# Patient Record
Sex: FEMALE | URBAN - METROPOLITAN AREA
[De-identification: names, ages, dates, MRNs, and addresses within clinical notes are randomized per-mention and may not be internally consistent; named-entity substitution may affect disease eponyms.]

---
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
## 2025-03-16 ENCOUNTER — HOSPITAL ENCOUNTER (INPATIENT)
Dept: HOSPITAL 104 - SERX | Age: 20
LOS: 4 days | Discharge: HOME | DRG: 720 | End: 2025-03-20
Payer: MEDICAID

## 2025-03-16 VITALS
TEMPERATURE: 100.04 F | OXYGEN SATURATION: 97 % | HEART RATE: 147 BPM | DIASTOLIC BLOOD PRESSURE: 56 MMHG | RESPIRATION RATE: 20 BRPM | SYSTOLIC BLOOD PRESSURE: 94 MMHG

## 2025-03-16 DIAGNOSIS — A41.51: Primary | ICD-10-CM

## 2025-03-16 DIAGNOSIS — N10: ICD-10-CM

## 2025-03-16 DIAGNOSIS — K35.80: ICD-10-CM

## 2025-03-16 DIAGNOSIS — Z87.440: ICD-10-CM

## 2025-03-16 DIAGNOSIS — E87.20: ICD-10-CM

## 2025-03-16 LAB
ALANINE AMINOTRANSFERASE: 20 U/L (ref 10–49)
ALBUMIN, SERUM: 4.3 GM/DL (ref 3.5–5)
ALBUMIN/GLOBULIN RATIO: 1.5 (ref 1.2–2.2)
ALKALINE PHOSPHATASE: 74 U/L (ref 46–116)
ANION GAP: 11 (ref 7–16)
ASPARTATE AMINO TRANSFERASE: 17 U/L (ref 0–34)
BASOPHILS # (AUTO): 0.1 THOU/MM3 (ref 0–0.2)
BASOPHILS % (AUTO): 0 % (ref 0–2.5)
BILIRUBIN,TOTAL: 0.9 MG/DL (ref 0.3–1.2)
BLOOD UREA NITROGEN: 9 MG/DL (ref 9–23)
BUN/CREATININE RATIO: 13 RATIO (ref 12–20)
CALCIUM (CORRECTED): 9.3 MG/DL (ref 8.5–10.1)
CALCIUM: 9.3 MG/DL (ref 8.3–10.6)
CARBON DIOXIDE: 24.4 MMOL/L (ref 20–31)
CHLORIDE: 103 MMOL/L (ref 98–107)
CREAT CL PREDICTED SERPL C-G-VRATE: 125.6 ML/MIN (ref 60–?)
CREATININE (COMPONENT): 0.7 MG/DL (ref 0.6–1.3)
EGFR: > 60 SEE NOTE
EOSINOPHILS # (AUTO): 0 THOU/MM3 (ref 0–0.5)
EOSINOPHILS % (AUTO): 0 % (ref 0–10)
GLOBULIN: 2.9 GM/DL (ref 2.3–3.5)
GLUCOSE: 108 MG/DL (ref 74–106)
HEMATOCRIT: 35.3 % (ref 36–46)
HEMOGLOBIN: 12.3 G/DL (ref 12–16)
IMM GRANULOCYTES # BLD AUTO: 0.45 THOU/MM3 (ref 0–0)
IMMATURE GRANULOCYTES % (AUTO): 2 % (ref 0–0)
LYMPHOCYTES # (AUTO): 1 THOU/MM3 (ref 1–4.8)
LYMPHOCYTES % (AUTO): 3 % (ref 10–50)
MEAN CORPUSCULAR HEMOGLOBIN: 29.2 PG (ref 25–35)
MEAN CORPUSCULAR HGB CONC: 34.8 G/DL (ref 31–37)
MEAN CORPUSCULAR VOLUME: 84 FL (ref 80–100)
MONOCYTES # (AUTO): 2.1 THOU/MM3 (ref 0–0.8)
MONOCYTES % (AUTO): 7 % (ref 0–12)
NEUTROPHILS # (AUTO): 24.8 THOU/MM3 (ref 1.8–7.7)
NEUTROPHILS % (AUTO): 88 % (ref 37–80)
NRBC BLD-RTO: 0 /100 WBC
NUCLEATED RED BLOOD CELL #: 0 THOU/MM3 (ref 0–0)
OSMOLALITY,CALCULATED: 275 (ref 275–295)
PLATELET COUNT: 176 THOU/MM3 (ref 140–440)
POTASSIUM: 3.7 MMOL/L (ref 3.4–5.1)
RDW STANDARD DEVIATION: 38.1 FL (ref 36.4–46.3)
RED BLOOD COUNT: 4.21 MILN/MM3 (ref 4–5.2)
SODIUM: 138 MMOL/L (ref 136–145)
TOTAL PROTEIN: 7.2 GM/DL (ref 5.7–8.2)
WHITE BLOOD COUNT: 28.4 THOU/MM3 (ref 3.6–11)

## 2025-03-16 PROCEDURE — 80053 COMPREHEN METABOLIC PANEL: CPT

## 2025-03-16 PROCEDURE — 71045 X-RAY EXAM CHEST 1 VIEW: CPT

## 2025-03-16 PROCEDURE — 87077 CULTURE AEROBIC IDENTIFY: CPT

## 2025-03-16 PROCEDURE — 87400 INFLUENZA A/B EACH AG IA: CPT

## 2025-03-16 PROCEDURE — 74176 CT ABD & PELVIS W/O CONTRAST: CPT

## 2025-03-16 PROCEDURE — 87186 SC STD MICRODIL/AGAR DIL: CPT

## 2025-03-16 PROCEDURE — 36415 COLL VENOUS BLD VENIPUNCTURE: CPT

## 2025-03-16 PROCEDURE — 85025 COMPLETE CBC W/AUTO DIFF WBC: CPT

## 2025-03-16 PROCEDURE — 84145 PROCALCITONIN (PCT): CPT

## 2025-03-16 PROCEDURE — 96375 TX/PRO/DX INJ NEW DRUG ADDON: CPT

## 2025-03-16 PROCEDURE — 81025 URINE PREGNANCY TEST: CPT

## 2025-03-16 PROCEDURE — 96361 HYDRATE IV INFUSION ADD-ON: CPT

## 2025-03-16 PROCEDURE — 85610 PROTHROMBIN TIME: CPT

## 2025-03-16 PROCEDURE — 83690 ASSAY OF LIPASE: CPT

## 2025-03-16 PROCEDURE — 84443 ASSAY THYROID STIM HORMONE: CPT

## 2025-03-16 PROCEDURE — 81001 URINALYSIS AUTO W/SCOPE: CPT

## 2025-03-16 PROCEDURE — 83880 ASSAY OF NATRIURETIC PEPTIDE: CPT

## 2025-03-16 PROCEDURE — 84100 ASSAY OF PHOSPHORUS: CPT

## 2025-03-16 PROCEDURE — 87086 URINE CULTURE/COLONY COUNT: CPT

## 2025-03-16 PROCEDURE — 96368 THER/DIAG CONCURRENT INF: CPT

## 2025-03-16 PROCEDURE — 83735 ASSAY OF MAGNESIUM: CPT

## 2025-03-16 PROCEDURE — 96367 TX/PROPH/DG ADDL SEQ IV INF: CPT

## 2025-03-16 PROCEDURE — A9270 NON-COVERED ITEM OR SERVICE: HCPCS

## 2025-03-16 PROCEDURE — 87040 BLOOD CULTURE FOR BACTERIA: CPT

## 2025-03-16 PROCEDURE — 83605 ASSAY OF LACTIC ACID: CPT

## 2025-03-16 PROCEDURE — 93005 ELECTROCARDIOGRAM TRACING: CPT

## 2025-03-16 PROCEDURE — 87811 SARS-COV-2 COVID19 W/OPTIC: CPT

## 2025-03-16 PROCEDURE — 83615 LACTATE (LD) (LDH) ENZYME: CPT

## 2025-03-16 PROCEDURE — 80307 DRUG TEST PRSMV CHEM ANLYZR: CPT

## 2025-03-16 PROCEDURE — 96365 THER/PROPH/DIAG IV INF INIT: CPT

## 2025-03-16 PROCEDURE — 99291 CRITICAL CARE FIRST HOUR: CPT

## 2025-03-16 PROCEDURE — 85730 THROMBOPLASTIN TIME PARTIAL: CPT

## 2025-03-16 RX ADMIN — SODIUM CHLORIDE 999 ML: 9 INJECTION, SOLUTION INTRAVENOUS at 23:15

## 2025-03-17 VITALS
OXYGEN SATURATION: 99 % | DIASTOLIC BLOOD PRESSURE: 78 MMHG | TEMPERATURE: 100.58 F | RESPIRATION RATE: 19 BRPM | SYSTOLIC BLOOD PRESSURE: 128 MMHG | HEART RATE: 115 BPM

## 2025-03-17 VITALS
RESPIRATION RATE: 17 BRPM | DIASTOLIC BLOOD PRESSURE: 62 MMHG | TEMPERATURE: 98.24 F | HEART RATE: 93 BPM | OXYGEN SATURATION: 100 % | SYSTOLIC BLOOD PRESSURE: 114 MMHG

## 2025-03-17 VITALS
DIASTOLIC BLOOD PRESSURE: 90 MMHG | OXYGEN SATURATION: 97 % | SYSTOLIC BLOOD PRESSURE: 116 MMHG | RESPIRATION RATE: 16 BRPM | TEMPERATURE: 102.92 F | HEART RATE: 132 BPM

## 2025-03-17 VITALS
SYSTOLIC BLOOD PRESSURE: 112 MMHG | OXYGEN SATURATION: 100 % | HEART RATE: 118 BPM | RESPIRATION RATE: 20 BRPM | DIASTOLIC BLOOD PRESSURE: 56 MMHG | TEMPERATURE: 103.3 F

## 2025-03-17 VITALS — HEART RATE: 118 BPM | RESPIRATION RATE: 18 BRPM

## 2025-03-17 VITALS
RESPIRATION RATE: 18 BRPM | OXYGEN SATURATION: 100 % | DIASTOLIC BLOOD PRESSURE: 58 MMHG | HEART RATE: 99 BPM | SYSTOLIC BLOOD PRESSURE: 107 MMHG | TEMPERATURE: 98.6 F

## 2025-03-17 VITALS
DIASTOLIC BLOOD PRESSURE: 58 MMHG | OXYGEN SATURATION: 98 % | HEART RATE: 97 BPM | TEMPERATURE: 99.86 F | RESPIRATION RATE: 20 BRPM | SYSTOLIC BLOOD PRESSURE: 106 MMHG

## 2025-03-17 VITALS
HEART RATE: 116 BPM | RESPIRATION RATE: 18 BRPM | DIASTOLIC BLOOD PRESSURE: 54 MMHG | TEMPERATURE: 99.32 F | OXYGEN SATURATION: 96 % | SYSTOLIC BLOOD PRESSURE: 104 MMHG

## 2025-03-17 VITALS — TEMPERATURE: 100.04 F

## 2025-03-17 VITALS
DIASTOLIC BLOOD PRESSURE: 55 MMHG | TEMPERATURE: 98.42 F | SYSTOLIC BLOOD PRESSURE: 107 MMHG | RESPIRATION RATE: 18 BRPM | OXYGEN SATURATION: 98 % | HEART RATE: 97 BPM

## 2025-03-17 VITALS
TEMPERATURE: 99.2 F | DIASTOLIC BLOOD PRESSURE: 50 MMHG | OXYGEN SATURATION: 98 % | SYSTOLIC BLOOD PRESSURE: 113 MMHG | HEART RATE: 107 BPM | RESPIRATION RATE: 20 BRPM

## 2025-03-17 VITALS
TEMPERATURE: 98.1 F | DIASTOLIC BLOOD PRESSURE: 54 MMHG | HEART RATE: 93 BPM | OXYGEN SATURATION: 98 % | RESPIRATION RATE: 18 BRPM | SYSTOLIC BLOOD PRESSURE: 115 MMHG

## 2025-03-17 VITALS — TEMPERATURE: 98.8 F

## 2025-03-17 VITALS — TEMPERATURE: 99.14 F

## 2025-03-17 VITALS — TEMPERATURE: 99.9 F

## 2025-03-17 VITALS — TEMPERATURE: 102.92 F

## 2025-03-17 VITALS — HEART RATE: 88 BPM | RESPIRATION RATE: 20 BRPM

## 2025-03-17 VITALS — TEMPERATURE: 100.58 F

## 2025-03-17 LAB
ALANINE AMINOTRANSFERASE: 12 U/L (ref 10–49)
ALBUMIN, SERUM: 3 GM/DL (ref 3.5–5)
ALBUMIN/GLOBULIN RATIO: 1.4 (ref 1.2–2.2)
ALKALINE PHOSPHATASE: 53 U/L (ref 46–116)
ANION GAP: 10 (ref 7–16)
ASPARTATE AMINO TRANSFERASE: 12 U/L (ref 0–34)
B-TYPE NATRIURETIC PEPTIDE: 102 PG/ML (ref 0–100)
BASOPHILS # (AUTO): 0.1 THOU/MM3 (ref 0–0.2)
BASOPHILS % (AUTO): 0 % (ref 0–2.5)
BILIRUBIN,TOTAL: 0.8 MG/DL (ref 0.3–1.2)
BLOOD UREA NITROGEN: 8 MG/DL (ref 9–23)
BLOOD,URINE: (no result)
BUN/CREATININE RATIO: 13 RATIO (ref 12–20)
CALCIUM (CORRECTED): 8.1 MG/DL (ref 8.5–10.1)
CALCIUM: 7.3 MG/DL (ref 8.3–10.6)
CARBON DIOXIDE: 21.2 MMOL/L (ref 20–31)
CHLORIDE: 105 MMOL/L (ref 98–107)
CREAT CL PREDICTED SERPL C-G-VRATE: 146.6 ML/MIN (ref 60–?)
CREATININE (COMPONENT): 0.6 MG/DL (ref 0.6–1.3)
EGFR: > 60 SEE NOTE
EOSINOPHILS # (AUTO): 0 THOU/MM3 (ref 0–0.5)
EOSINOPHILS % (AUTO): 0 % (ref 0–10)
GLOBULIN: 2.2 GM/DL (ref 2.3–3.5)
GLUCOSE: 114 MG/DL (ref 74–106)
HEMATOCRIT: 26.7 % (ref 36–46)
HEMOGLOBIN: 9.3 G/DL (ref 12–16)
IMM GRANULOCYTES # BLD AUTO: 0.37 THOU/MM3 (ref 0–0)
IMMATURE GRANULOCYTES % (AUTO): 2 % (ref 0–0)
INR: 1.2 (ref 0.9–1.3)
KETONES UR QL: (no result)
KETONES,URINE: (no result)
LACTATE SERPL-SCNC: 2.1 MMOL/L (ref 0.4–2)
LACTIC ACID, 3 HR: 1.1 MMOL/L (ref 0.4–2)
LDH (LACTATE DEHYDROGENASE): 134 U/L (ref 120–246)
LIPASE: 23 U/L (ref 12–53)
LYMPHOCYTES # (AUTO): 1.2 THOU/MM3 (ref 1–4.8)
LYMPHOCYTES % (AUTO): 5 % (ref 10–50)
MAGNESIUM: 1.5 MG/DL (ref 1.6–2.6)
MAGNESIUM: 1.7 MG/DL (ref 1.6–2.6)
MEAN CORPUSCULAR HEMOGLOBIN: 29.6 PG (ref 25–35)
MEAN CORPUSCULAR HGB CONC: 34.8 G/DL (ref 31–37)
MEAN CORPUSCULAR VOLUME: 85 FL (ref 80–100)
MONOCYTES # (AUTO): 1.9 THOU/MM3 (ref 0–0.8)
MONOCYTES % (AUTO): 8 % (ref 0–12)
NEUTROPHILS # (AUTO): 19.7 THOU/MM3 (ref 1.8–7.7)
NEUTROPHILS % (AUTO): 85 % (ref 37–80)
NRBC BLD-RTO: 0 /100 WBC
NUCLEATED RED BLOOD CELL #: 0 THOU/MM3 (ref 0–0)
OSMOLALITY,CALCULATED: 271 (ref 275–295)
PARTIAL THROMBOPLASTIN TIME: 29.6 SECONDS (ref 22–36)
PH,URINE: 6 (ref 5–7)
PHOSPHOROUS: 3.6 MG/DL (ref 2.4–5.1)
PLATELET COUNT: 136 THOU/MM3 (ref 140–440)
POTASSIUM: 3 MMOL/L (ref 3.4–5.1)
PROCALCITONIN: 3.99 NG/ML (ref 0–0.49)
PROTEIN,URINE: (no result)
PROTHROMBIN TIME: 12.8 SECONDS (ref 9–12.2)
RBC,URINE: 7 /HPF (ref 0–3)
RDW STANDARD DEVIATION: 38.5 FL (ref 36.4–46.3)
RED BLOOD COUNT: 3.14 MILN/MM3 (ref 4–5.2)
SODIUM: 136 MMOL/L (ref 136–145)
SPECIFIC GRAVITY,URINE: 1.02 (ref 1–1.03)
SQUAMOUS EPITHELIAL CELL,URINE: 1 /HPF (ref 0–5)
TOTAL PROTEIN: 5.2 GM/DL (ref 5.7–8.2)
WBC,URINE: 63 /HPF (ref 0–5)
WHITE BLOOD COUNT: 23.2 THOU/MM3 (ref 3.6–11)

## 2025-03-17 RX ADMIN — SODIUM CHLORIDE 75 ML: 9 INJECTION, SOLUTION INTRAVENOUS at 05:18

## 2025-03-17 RX ADMIN — SODIUM CHLORIDE 999 ML: 9 INJECTION, SOLUTION INTRAVENOUS at 05:17

## 2025-03-17 RX ADMIN — SODIUM CHLORIDE 75 ML: 9 INJECTION, SOLUTION INTRAVENOUS at 19:42

## 2025-03-17 RX ADMIN — Medication 600 MG: at 10:37

## 2025-03-17 RX ADMIN — ACETAMINOPHEN 325MG 650 MG: 325 TABLET ORAL at 18:35

## 2025-03-17 RX ADMIN — KETOROLAC TROMETHAMINE 30 MG: 30 INJECTION, SOLUTION INTRAMUSCULAR; INTRAVENOUS at 03:46

## 2025-03-17 RX ADMIN — SODIUM CHLORIDE 999 ML: 9 INJECTION, SOLUTION INTRAVENOUS at 04:04

## 2025-03-17 RX ADMIN — ACETAMINOPHEN 325MG 650 MG: 325 TABLET ORAL at 12:38

## 2025-03-18 VITALS
OXYGEN SATURATION: 99 % | DIASTOLIC BLOOD PRESSURE: 82 MMHG | SYSTOLIC BLOOD PRESSURE: 139 MMHG | HEART RATE: 114 BPM | RESPIRATION RATE: 18 BRPM | TEMPERATURE: 99.68 F

## 2025-03-18 VITALS
SYSTOLIC BLOOD PRESSURE: 116 MMHG | TEMPERATURE: 98.1 F | RESPIRATION RATE: 17 BRPM | DIASTOLIC BLOOD PRESSURE: 66 MMHG | OXYGEN SATURATION: 99 % | HEART RATE: 84 BPM

## 2025-03-18 VITALS
DIASTOLIC BLOOD PRESSURE: 85 MMHG | TEMPERATURE: 102.74 F | SYSTOLIC BLOOD PRESSURE: 145 MMHG | OXYGEN SATURATION: 96 % | HEART RATE: 129 BPM | RESPIRATION RATE: 20 BRPM

## 2025-03-18 VITALS — HEART RATE: 90 BPM | RESPIRATION RATE: 18 BRPM

## 2025-03-18 VITALS
OXYGEN SATURATION: 96 % | HEART RATE: 126 BPM | SYSTOLIC BLOOD PRESSURE: 145 MMHG | TEMPERATURE: 102.74 F | RESPIRATION RATE: 20 BRPM | DIASTOLIC BLOOD PRESSURE: 70 MMHG

## 2025-03-18 VITALS
SYSTOLIC BLOOD PRESSURE: 115 MMHG | TEMPERATURE: 97.7 F | RESPIRATION RATE: 16 BRPM | HEART RATE: 92 BPM | OXYGEN SATURATION: 97 % | DIASTOLIC BLOOD PRESSURE: 60 MMHG

## 2025-03-18 VITALS — TEMPERATURE: 100.04 F

## 2025-03-18 VITALS
OXYGEN SATURATION: 100 % | DIASTOLIC BLOOD PRESSURE: 68 MMHG | HEART RATE: 93 BPM | TEMPERATURE: 98.24 F | SYSTOLIC BLOOD PRESSURE: 114 MMHG | RESPIRATION RATE: 17 BRPM

## 2025-03-18 VITALS
SYSTOLIC BLOOD PRESSURE: 135 MMHG | RESPIRATION RATE: 20 BRPM | TEMPERATURE: 102.8 F | DIASTOLIC BLOOD PRESSURE: 71 MMHG | OXYGEN SATURATION: 96 % | HEART RATE: 129 BPM

## 2025-03-18 VITALS — RESPIRATION RATE: 20 BRPM | HEART RATE: 108 BPM

## 2025-03-18 VITALS
HEART RATE: 133 BPM | SYSTOLIC BLOOD PRESSURE: 114 MMHG | RESPIRATION RATE: 21 BRPM | TEMPERATURE: 101.1 F | DIASTOLIC BLOOD PRESSURE: 66 MMHG | OXYGEN SATURATION: 93 %

## 2025-03-18 VITALS — TEMPERATURE: 101.66 F

## 2025-03-18 VITALS
RESPIRATION RATE: 16 BRPM | DIASTOLIC BLOOD PRESSURE: 67 MMHG | HEART RATE: 105 BPM | OXYGEN SATURATION: 95 % | SYSTOLIC BLOOD PRESSURE: 118 MMHG | TEMPERATURE: 99.14 F

## 2025-03-18 VITALS — TEMPERATURE: 99.68 F

## 2025-03-18 VITALS — TEMPERATURE: 99.1 F

## 2025-03-18 VITALS — TEMPERATURE: 98.3 F

## 2025-03-18 LAB
ALANINE AMINOTRANSFERASE: 18 U/L (ref 10–49)
ALBUMIN, SERUM: 3.3 GM/DL (ref 3.5–5)
ALBUMIN/GLOBULIN RATIO: 1.4 (ref 1.2–2.2)
ALKALINE PHOSPHATASE: 91 U/L (ref 46–116)
ANION GAP: 9 (ref 7–16)
ASPARTATE AMINO TRANSFERASE: 21 U/L (ref 0–34)
BASOPHILS # (AUTO): 0 THOU/MM3 (ref 0–0.2)
BASOPHILS % (AUTO): 0 % (ref 0–2.5)
BILIRUBIN,TOTAL: 0.6 MG/DL (ref 0.3–1.2)
BLOOD UREA NITROGEN: 5 MG/DL (ref 9–23)
BUN/CREATININE RATIO: 8 RATIO (ref 12–20)
CALCIUM (CORRECTED): 8.6 MG/DL (ref 8.5–10.1)
CALCIUM: 8 MG/DL (ref 8.3–10.6)
CARBON DIOXIDE: 20.4 MMOL/L (ref 20–31)
CHLORIDE: 106 MMOL/L (ref 98–107)
CREAT CL PREDICTED SERPL C-G-VRATE: 146.6 ML/MIN (ref 60–?)
CREATININE (COMPONENT): 0.6 MG/DL (ref 0.6–1.3)
EGFR: > 60 SEE NOTE
EOSINOPHILS # (AUTO): 0 THOU/MM3 (ref 0–0.5)
EOSINOPHILS % (AUTO): 0 % (ref 0–10)
GLOBULIN: 2.4 GM/DL (ref 2.3–3.5)
GLUCOSE: 109 MG/DL (ref 74–106)
HEMATOCRIT: 29.7 % (ref 36–46)
HEMOGLOBIN: 10.2 G/DL (ref 12–16)
IMM GRANULOCYTES # BLD AUTO: 0.13 THOU/MM3 (ref 0–0)
IMMATURE GRANULOCYTES % (AUTO): 1 % (ref 0–0)
LACTATE SERPL-SCNC: 0.9 MMOL/L (ref 0.4–2)
LYMPHOCYTES # (AUTO): 0.9 THOU/MM3 (ref 1–4.8)
LYMPHOCYTES % (AUTO): 6 % (ref 10–50)
MAGNESIUM: 1.7 MG/DL (ref 1.6–2.6)
MEAN CORPUSCULAR HEMOGLOBIN: 29.6 PG (ref 25–35)
MEAN CORPUSCULAR HGB CONC: 34.3 G/DL (ref 31–37)
MEAN CORPUSCULAR VOLUME: 86 FL (ref 80–100)
MONOCYTES # (AUTO): 1.3 THOU/MM3 (ref 0–0.8)
MONOCYTES % (AUTO): 8 % (ref 0–12)
NEUTROPHILS # (AUTO): 14.5 THOU/MM3 (ref 1.8–7.7)
NEUTROPHILS % (AUTO): 86 % (ref 37–80)
NRBC BLD-RTO: 0 /100 WBC
NUCLEATED RED BLOOD CELL #: 0 THOU/MM3 (ref 0–0)
OSMOLALITY,CALCULATED: 268 (ref 275–295)
PHOSPHOROUS: 0.9 MG/DL (ref 2.4–5.1)
PLATELET COUNT: 142 THOU/MM3 (ref 140–440)
POTASSIUM: 4 MMOL/L (ref 3.4–5.1)
PROCALCITONIN: 3.36 NG/ML (ref 0–0.49)
RDW STANDARD DEVIATION: 39.6 FL (ref 36.4–46.3)
RED BLOOD COUNT: 3.45 MILN/MM3 (ref 4–5.2)
SODIUM: 135 MMOL/L (ref 136–145)
THYROID STIMULATING HORMONE: 0.56 UIU/ML (ref 0.55–4.78)
TOTAL PROTEIN: 5.7 GM/DL (ref 5.7–8.2)
WHITE BLOOD COUNT: 16.9 THOU/MM3 (ref 3.6–11)

## 2025-03-18 RX ADMIN — ACETAMINOPHEN 325MG 650 MG: 325 TABLET ORAL at 19:50

## 2025-03-18 RX ADMIN — SODIUM CHLORIDE 75 ML: 9 INJECTION, SOLUTION INTRAVENOUS at 08:40

## 2025-03-18 RX ADMIN — SODIUM CHLORIDE 999 ML: 9 INJECTION, SOLUTION INTRAVENOUS at 03:41

## 2025-03-18 RX ADMIN — ACETAMINOPHEN 325MG 650 MG: 325 TABLET ORAL at 01:39

## 2025-03-19 VITALS
HEART RATE: 86 BPM | DIASTOLIC BLOOD PRESSURE: 81 MMHG | TEMPERATURE: 98.96 F | OXYGEN SATURATION: 95 % | SYSTOLIC BLOOD PRESSURE: 113 MMHG | RESPIRATION RATE: 16 BRPM

## 2025-03-19 VITALS
SYSTOLIC BLOOD PRESSURE: 110 MMHG | OXYGEN SATURATION: 99 % | HEART RATE: 87 BPM | DIASTOLIC BLOOD PRESSURE: 63 MMHG | RESPIRATION RATE: 18 BRPM | TEMPERATURE: 98.24 F

## 2025-03-19 VITALS
TEMPERATURE: 100.22 F | DIASTOLIC BLOOD PRESSURE: 77 MMHG | HEART RATE: 102 BPM | RESPIRATION RATE: 15 BRPM | OXYGEN SATURATION: 97 % | SYSTOLIC BLOOD PRESSURE: 111 MMHG

## 2025-03-19 VITALS — TEMPERATURE: 99.14 F

## 2025-03-19 VITALS
HEART RATE: 104 BPM | SYSTOLIC BLOOD PRESSURE: 120 MMHG | RESPIRATION RATE: 16 BRPM | DIASTOLIC BLOOD PRESSURE: 67 MMHG | OXYGEN SATURATION: 100 % | TEMPERATURE: 97.16 F

## 2025-03-19 VITALS
RESPIRATION RATE: 17 BRPM | SYSTOLIC BLOOD PRESSURE: 118 MMHG | OXYGEN SATURATION: 96 % | DIASTOLIC BLOOD PRESSURE: 64 MMHG | TEMPERATURE: 98.3 F | HEART RATE: 94 BPM

## 2025-03-19 VITALS
HEART RATE: 87 BPM | RESPIRATION RATE: 18 BRPM | OXYGEN SATURATION: 96 % | TEMPERATURE: 97.52 F | SYSTOLIC BLOOD PRESSURE: 121 MMHG | DIASTOLIC BLOOD PRESSURE: 72 MMHG

## 2025-03-19 VITALS — HEART RATE: 101 BPM | RESPIRATION RATE: 18 BRPM

## 2025-03-19 VITALS — TEMPERATURE: 100.2 F

## 2025-03-19 LAB
ALANINE AMINOTRANSFERASE: 25 U/L (ref 10–49)
ALBUMIN, SERUM: 3.4 GM/DL (ref 3.5–5)
ALBUMIN/GLOBULIN RATIO: 1.5 (ref 1.2–2.2)
ALKALINE PHOSPHATASE: 93 U/L (ref 46–116)
ANION GAP: 9 (ref 7–16)
ASPARTATE AMINO TRANSFERASE: 23 U/L (ref 0–34)
BASOPHILS # (AUTO): 0 THOU/MM3 (ref 0–0.2)
BASOPHILS % (AUTO): 0 % (ref 0–2.5)
BILIRUBIN,TOTAL: 0.4 MG/DL (ref 0.3–1.2)
BLOOD UREA NITROGEN: < 5 MG/DL (ref 9–23)
BUN/CREATININE RATIO: 8 RATIO (ref 12–20)
CALCIUM (CORRECTED): 8.7 MG/DL (ref 8.5–10.1)
CALCIUM: 8.2 MG/DL (ref 8.3–10.6)
CARBON DIOXIDE: 23.3 MMOL/L (ref 20–31)
CHLORIDE: 107 MMOL/L (ref 98–107)
CREAT CL PREDICTED SERPL C-G-VRATE: 146.6 ML/MIN (ref 60–?)
CREATININE (COMPONENT): 0.6 MG/DL (ref 0.6–1.3)
EGFR: > 60 SEE NOTE
EOSINOPHILS # (AUTO): 0 THOU/MM3 (ref 0–0.5)
EOSINOPHILS % (AUTO): 0 % (ref 0–10)
GLOBULIN: 2.3 GM/DL (ref 2.3–3.5)
GLUCOSE: 103 MG/DL (ref 74–106)
HEMATOCRIT: 29 % (ref 36–46)
HEMOGLOBIN: 9.9 G/DL (ref 12–16)
IMM GRANULOCYTES # BLD AUTO: 0.03 THOU/MM3 (ref 0–0)
IMMATURE GRANULOCYTES % (AUTO): 0 % (ref 0–0)
LYMPHOCYTES # (AUTO): 1 THOU/MM3 (ref 1–4.8)
LYMPHOCYTES % (AUTO): 11 % (ref 10–50)
MAGNESIUM: 1.9 MG/DL (ref 1.6–2.6)
MEAN CORPUSCULAR HEMOGLOBIN: 29.4 PG (ref 25–35)
MEAN CORPUSCULAR HGB CONC: 34.1 G/DL (ref 31–37)
MEAN CORPUSCULAR VOLUME: 86 FL (ref 80–100)
MONOCYTES # (AUTO): 0.8 THOU/MM3 (ref 0–0.8)
MONOCYTES % (AUTO): 9 % (ref 0–12)
NEUTROPHILS # (AUTO): 6.7 THOU/MM3 (ref 1.8–7.7)
NEUTROPHILS % (AUTO): 79 % (ref 37–80)
NRBC BLD-RTO: 0 /100 WBC
NUCLEATED RED BLOOD CELL #: 0 THOU/MM3 (ref 0–0)
OSMOLALITY,CALCULATED: 274 (ref 275–295)
PHOSPHOROUS: 2.1 MG/DL (ref 2.4–5.1)
PLATELET COUNT: 149 THOU/MM3 (ref 140–440)
POTASSIUM: 3.8 MMOL/L (ref 3.4–5.1)
RDW STANDARD DEVIATION: 39.9 FL (ref 36.4–46.3)
RED BLOOD COUNT: 3.37 MILN/MM3 (ref 4–5.2)
SODIUM: 139 MMOL/L (ref 136–145)
TOTAL PROTEIN: 5.7 GM/DL (ref 5.7–8.2)
WHITE BLOOD COUNT: 8.5 THOU/MM3 (ref 3.6–11)

## 2025-03-19 RX ADMIN — ACETAMINOPHEN 325MG 650 MG: 325 TABLET ORAL at 17:40

## 2025-03-19 RX ADMIN — ACETAMINOPHEN 325MG 650 MG: 325 TABLET ORAL at 08:04

## 2025-03-20 VITALS
OXYGEN SATURATION: 99 % | HEART RATE: 106 BPM | RESPIRATION RATE: 18 BRPM | DIASTOLIC BLOOD PRESSURE: 85 MMHG | TEMPERATURE: 98.06 F | SYSTOLIC BLOOD PRESSURE: 135 MMHG

## 2025-03-20 VITALS
TEMPERATURE: 98.4 F | SYSTOLIC BLOOD PRESSURE: 116 MMHG | HEART RATE: 97 BPM | OXYGEN SATURATION: 97 % | DIASTOLIC BLOOD PRESSURE: 75 MMHG | RESPIRATION RATE: 16 BRPM

## 2025-03-20 VITALS
SYSTOLIC BLOOD PRESSURE: 127 MMHG | OXYGEN SATURATION: 100 % | HEART RATE: 78 BPM | TEMPERATURE: 98.24 F | RESPIRATION RATE: 18 BRPM | DIASTOLIC BLOOD PRESSURE: 76 MMHG

## 2025-03-20 VITALS
HEART RATE: 107 BPM | RESPIRATION RATE: 18 BRPM | OXYGEN SATURATION: 98 % | SYSTOLIC BLOOD PRESSURE: 119 MMHG | DIASTOLIC BLOOD PRESSURE: 60 MMHG | TEMPERATURE: 98.96 F

## 2025-03-20 LAB
ALANINE AMINOTRANSFERASE: 20 U/L (ref 10–49)
ALBUMIN, SERUM: 3.6 GM/DL (ref 3.5–5)
ALBUMIN/GLOBULIN RATIO: 1.4 (ref 1.2–2.2)
ALKALINE PHOSPHATASE: 91 U/L (ref 46–116)
ANION GAP: 10 (ref 7–16)
ASPARTATE AMINO TRANSFERASE: 18 U/L (ref 0–34)
BASOPHILS # (AUTO): 0 THOU/MM3 (ref 0–0.2)
BASOPHILS % (AUTO): 0 % (ref 0–2.5)
BILIRUBIN,TOTAL: 0.3 MG/DL (ref 0.3–1.2)
BLOOD UREA NITROGEN: < 5 MG/DL (ref 9–23)
BUN/CREATININE RATIO: 10 RATIO (ref 12–20)
CALCIUM (CORRECTED): 8.8 MG/DL (ref 8.5–10.1)
CALCIUM: 8.5 MG/DL (ref 8.3–10.6)
CARBON DIOXIDE: 23.9 MMOL/L (ref 20–31)
CHLORIDE: 106 MMOL/L (ref 98–107)
CREAT CL PREDICTED SERPL C-G-VRATE: 175.9 ML/MIN (ref 60–?)
CREATININE (COMPONENT): 0.5 MG/DL (ref 0.6–1.3)
EGFR: > 60 SEE NOTE
EOSINOPHILS # (AUTO): 0 THOU/MM3 (ref 0–0.5)
EOSINOPHILS % (AUTO): 0 % (ref 0–10)
GLOBULIN: 2.5 GM/DL (ref 2.3–3.5)
GLUCOSE: 98 MG/DL (ref 74–106)
HEMATOCRIT: 30.9 % (ref 36–46)
HEMOGLOBIN: 10.6 G/DL (ref 12–16)
IMM GRANULOCYTES # BLD AUTO: 0.03 THOU/MM3 (ref 0–0)
IMMATURE GRANULOCYTES % (AUTO): 1 % (ref 0–0)
LYMPHOCYTES # (AUTO): 1.4 THOU/MM3 (ref 1–4.8)
LYMPHOCYTES % (AUTO): 21 % (ref 10–50)
MAGNESIUM: 1.9 MG/DL (ref 1.6–2.6)
MEAN CORPUSCULAR HEMOGLOBIN: 29.2 PG (ref 25–35)
MEAN CORPUSCULAR HGB CONC: 34.3 G/DL (ref 31–37)
MEAN CORPUSCULAR VOLUME: 85 FL (ref 80–100)
MONOCYTES # (AUTO): 0.7 THOU/MM3 (ref 0–0.8)
MONOCYTES % (AUTO): 11 % (ref 0–12)
NEUTROPHILS # (AUTO): 4.3 THOU/MM3 (ref 1.8–7.7)
NEUTROPHILS % (AUTO): 67 % (ref 37–80)
NRBC BLD-RTO: 0 /100 WBC
NUCLEATED RED BLOOD CELL #: 0 THOU/MM3 (ref 0–0)
OSMOLALITY,CALCULATED: 276 (ref 275–295)
PHOSPHOROUS: 3.4 MG/DL (ref 2.4–5.1)
PLATELET COUNT: 202 THOU/MM3 (ref 140–440)
POTASSIUM: 3.8 MMOL/L (ref 3.4–5.1)
RDW STANDARD DEVIATION: 39 FL (ref 36.4–46.3)
RED BLOOD COUNT: 3.63 MILN/MM3 (ref 4–5.2)
SODIUM: 140 MMOL/L (ref 136–145)
TOTAL PROTEIN: 6.1 GM/DL (ref 5.7–8.2)
WHITE BLOOD COUNT: 6.5 THOU/MM3 (ref 3.6–11)